# Patient Record
Sex: FEMALE | Race: ASIAN | URBAN - METROPOLITAN AREA
[De-identification: names, ages, dates, MRNs, and addresses within clinical notes are randomized per-mention and may not be internally consistent; named-entity substitution may affect disease eponyms.]

---

## 2019-08-07 ENCOUNTER — OFFICE VISIT (OUTPATIENT)
Dept: SURGERY | Facility: CLINIC | Age: 59
End: 2019-08-07
Payer: MEDICAID

## 2019-08-07 VITALS — BODY MASS INDEX: 37.77 KG/M2 | HEIGHT: 66 IN | WEIGHT: 235 LBS

## 2019-08-07 DIAGNOSIS — D24.1 PAPILLOMA OF RIGHT BREAST: Primary | ICD-10-CM

## 2019-08-07 PROCEDURE — 99204 OFFICE O/P NEW MOD 45 MIN: CPT | Performed by: SURGERY

## 2019-08-07 RX ORDER — DIPHENOXYLATE HYDROCHLORIDE AND ATROPINE SULFATE 2.5; .025 MG/1; MG/1
TABLET ORAL
Refills: 3 | COMMUNITY
Start: 2019-07-25

## 2019-08-07 RX ORDER — MELATONIN
Refills: 0 | COMMUNITY
Start: 2019-04-13

## 2019-08-07 RX ORDER — LOSARTAN POTASSIUM 100 MG/1
TABLET ORAL
Refills: 3 | COMMUNITY
Start: 2019-07-15

## 2019-08-07 RX ORDER — SIMVASTATIN 40 MG
TABLET ORAL
Refills: 0 | COMMUNITY
Start: 2019-07-15

## 2019-08-07 NOTE — PATIENT INSTRUCTIONS
Ms. Torrie Sneed is instructed to schedule her right breast needle localization lumpectomy x2 at [facility] on Friday 23rd per her preference.   Explained all risks options in the presence of her  and they verbalized understanding

## 2019-08-07 NOTE — H&P
History and Physical      HPI   Patient presents with:  Breast Lump: Right breast mass. States she had had for approximately 10 years. States she can palpate. Denies pain, nipple drainage, or drainage from the mass.       HPI  Tomer Ours is a 60 year o lungs are clear to auscultation bilaterally normal respiratory effort  Cardiovascular: regular rate and rhythm no murmurs, gallups, or rubs  Abdomen: soft non-tender non-distended no organomegaly noted no masses  Extremities: no edema, cyanosis, or clubbin

## 2019-08-22 ENCOUNTER — TELEPHONE (OUTPATIENT)
Dept: SURGERY | Facility: CLINIC | Age: 59
End: 2019-08-22

## 2019-08-22 NOTE — TELEPHONE ENCOUNTER
Jose Angel Hernandez is requesting pre testing and lumpectecomy orders be faxed over.  [facility] fax to [phone]

## 2019-08-23 NOTE — TELEPHONE ENCOUNTER
Hi Dr. Osbaldo Delacruz, Who is Isaac Bernard? Do you need preop testing orders for this pt, and I'm not sure what lumpectomy orders are? Can you enter in epic?

## 2019-08-23 NOTE — TELEPHONE ENCOUNTER
She had surgery today.   Jose Angel Hernandez may work at the [employer] in [facility] where the localizations are done before surgery

## 2019-09-04 ENCOUNTER — OFFICE VISIT (OUTPATIENT)
Dept: SURGERY | Facility: CLINIC | Age: 59
End: 2019-09-04
Payer: MEDICAID

## 2019-09-04 VITALS — TEMPERATURE: 99 F

## 2019-09-04 DIAGNOSIS — T81.509S: Primary | ICD-10-CM

## 2019-09-04 DIAGNOSIS — Z98.890 POST-OPERATIVE STATE: ICD-10-CM

## 2019-09-04 PROCEDURE — 99024 POSTOP FOLLOW-UP VISIT: CPT | Performed by: SURGERY

## 2019-09-04 NOTE — PROGRESS NOTES
Postoperative Patient Follow-up      9/4/2019    HPI  Patient presents with:  Post-Op: Patient here today  for surgical follow up. Patient had B lumpectomy at [facility] [address]. Patient reports she has minimal pain and drainage.   Patient states she has had fever for